# Patient Record
Sex: MALE | Race: WHITE | Employment: PART TIME | URBAN - METROPOLITAN AREA
[De-identification: names, ages, dates, MRNs, and addresses within clinical notes are randomized per-mention and may not be internally consistent; named-entity substitution may affect disease eponyms.]

---

## 2024-08-26 ENCOUNTER — OFFICE VISIT (OUTPATIENT)
Dept: ENDOCRINOLOGY | Facility: CLINIC | Age: 24
End: 2024-08-26
Payer: COMMERCIAL

## 2024-08-26 VITALS
SYSTOLIC BLOOD PRESSURE: 128 MMHG | WEIGHT: 131.4 LBS | OXYGEN SATURATION: 98 % | TEMPERATURE: 98.7 F | BODY MASS INDEX: 17.8 KG/M2 | HEART RATE: 96 BPM | HEIGHT: 72 IN | DIASTOLIC BLOOD PRESSURE: 70 MMHG

## 2024-08-26 DIAGNOSIS — E10.44 DIABETIC AMYOTROPHY ASSOCIATED WITH TYPE 1 DIABETES MELLITUS (HCC): ICD-10-CM

## 2024-08-26 DIAGNOSIS — E10.9 TYPE 1 DIABETES MELLITUS WITHOUT COMPLICATION (HCC): Primary | ICD-10-CM

## 2024-08-26 LAB — SL AMB POCT HEMOGLOBIN AIC: 8.7 (ref ?–6.5)

## 2024-08-26 PROCEDURE — 83036 HEMOGLOBIN GLYCOSYLATED A1C: CPT | Performed by: STUDENT IN AN ORGANIZED HEALTH CARE EDUCATION/TRAINING PROGRAM

## 2024-08-26 PROCEDURE — 99244 OFF/OP CNSLTJ NEW/EST MOD 40: CPT | Performed by: STUDENT IN AN ORGANIZED HEALTH CARE EDUCATION/TRAINING PROGRAM

## 2024-08-26 RX ORDER — INSULIN ASPART 100 [IU]/ML
INJECTION, SOLUTION INTRAVENOUS; SUBCUTANEOUS
Qty: 18 ML | Refills: 0 | Status: SHIPPED | OUTPATIENT
Start: 2024-08-26

## 2024-08-26 RX ORDER — BLOOD SUGAR DIAGNOSTIC
STRIP MISCELLANEOUS
Qty: 200 EACH | Refills: 1 | Status: SHIPPED | OUTPATIENT
Start: 2024-08-26

## 2024-08-26 RX ORDER — LANCETS 30 GAUGE
EACH MISCELLANEOUS
Qty: 200 EACH | Refills: 1 | Status: SHIPPED | OUTPATIENT
Start: 2024-08-26

## 2024-08-26 RX ORDER — GABAPENTIN 600 MG/1
600 TABLET ORAL 3 TIMES DAILY
COMMUNITY
Start: 2024-08-15

## 2024-08-26 RX ORDER — BUPRENORPHINE HYDROCHLORIDE AND NALOXONE HYDROCHLORIDE DIHYDRATE 8; 2 MG/1; MG/1
1 TABLET SUBLINGUAL DAILY
COMMUNITY
Start: 2024-08-15

## 2024-08-26 RX ORDER — ACYCLOVIR 400 MG/1
1 TABLET ORAL CONTINUOUS
Qty: 1 EACH | Refills: 0 | Status: SHIPPED | OUTPATIENT
Start: 2024-08-26 | End: 2024-11-24

## 2024-08-26 RX ORDER — INSULIN LISPRO 100 [IU]/ML
INJECTION, SOLUTION INTRAVENOUS; SUBCUTANEOUS
COMMUNITY
End: 2024-08-26

## 2024-08-26 RX ORDER — INSULIN GLARGINE 100 [IU]/ML
14 INJECTION, SOLUTION SUBCUTANEOUS 2 TIMES DAILY
COMMUNITY
End: 2024-08-26 | Stop reason: SDUPTHER

## 2024-08-26 RX ORDER — INSULIN GLARGINE 100 [IU]/ML
12 INJECTION, SOLUTION SUBCUTANEOUS 2 TIMES DAILY
Qty: 24 ML | Refills: 0 | Status: SHIPPED | OUTPATIENT
Start: 2024-08-26 | End: 2024-11-24

## 2024-08-26 RX ORDER — ACYCLOVIR 400 MG/1
1 TABLET ORAL
Qty: 9 EACH | Refills: 1 | Status: SHIPPED | OUTPATIENT
Start: 2024-08-26 | End: 2025-02-22

## 2024-08-26 RX ORDER — PEN NEEDLE, DIABETIC 32GX 5/32"
NEEDLE, DISPOSABLE MISCELLANEOUS 4 TIMES DAILY
Qty: 200 EACH | Refills: 1 | Status: SHIPPED | OUTPATIENT
Start: 2024-08-26 | End: 2024-11-24

## 2024-08-26 NOTE — PROGRESS NOTES
New Patient Progress Note      Cc: diabetes    Referring Provider  Referral Self  No address on file     History of Present Illness:   Gamaliel Coates is a 24 y.o. male with a history of type 1 diabetes since age 11 who presents to establish care with us.      He was following with endocrinology [facility].    He is currently on Lantus 12 units twice daily,   Humalog ICR : 1:10,   1 unit for each 30 above 180 mg/dl    He tried on and off insulin pump in the past,    No recent DKA,      SMBG:    X 3-4 daily    Hypoglycemic episodes:   H/o of hypoglycemia causing hospitalization or Intervention such as glucagon injection  or ambulance call : no  hypoglycemia awareness: yes :      Opthamology: UTD  Podiatry: no      Has hypertension: no  Has hyperlipidemia: no    Thyroid disorders: no    Patient Active Problem List   Diagnosis    Type 1 diabetes mellitus without complication (HCC)      No past medical history on file.   No past surgical history on file.   No family history on file.  Social History     Tobacco Use    Smoking status: Former     Types: Cigarettes    Smokeless tobacco: Not on file   Substance Use Topics    Alcohol use: Not Currently     No Known Allergies      Current Outpatient Medications:     buprenorphine-naloxone (SUBOXONE) 8-2 mg per SL tablet, Place 1 tablet under the tongue daily, Disp: , Rfl:     Continuous Glucose  (Dexcom G7 ) KOJO, Use 1 each continuous, Disp: 1 each, Rfl: 0    Continuous Glucose Sensor (Dexcom G7 Sensor), Use 1 Device every 10 days, Disp: 9 each, Rfl: 1    gabapentin (NEURONTIN) 600 MG tablet, Take 600 mg by mouth 3 (three) times a day, Disp: , Rfl:     glucose blood (OneTouch Verio) test strip, To check blood sugars 4 times daily, Disp: 200 each, Rfl: 1    insulin aspart (NovoLOG FlexPen) 100 UNIT/ML injection pen, ICR: 1: 10 , ISF : 30 with goal 180 Maximum 30 units, Disp: 18 mL, Rfl: 0    Insulin Glargine Solostar (Lantus SoloStar) 100 UNIT/ML SOPN,  "Inject 0.12 mL (12 Units total) under the skin 2 (two) times a day, Disp: 24 mL, Rfl: 0    Insulin Pen Needle (BD Pen Needle Cristal U/F) 32G X 4 MM MISC, Use 4 (four) times a day, Disp: 200 each, Rfl: 1    OneTouch Delica Lancets 30G MISC, To check blood sugars 4 times daily, Disp: 200 each, Rfl: 1  Review of Systems   Constitutional:  Negative for appetite change, fatigue and unexpected weight change.   HENT:  Negative for trouble swallowing.    Eyes:  Negative for visual disturbance.   Cardiovascular:  Negative for chest pain and palpitations.   Gastrointestinal:  Negative for abdominal pain, constipation, diarrhea, nausea and vomiting.   Endocrine: Negative for polydipsia, polyphagia and polyuria.       Physical Exam:  Body mass index is 17.82 kg/m².  /70 (BP Location: Right arm, Patient Position: Sitting, Cuff Size: Adult)   Pulse 96   Temp 98.7 °F (37.1 °C)   Ht 6' (1.829 m)   Wt 59.6 kg (131 lb 6.4 oz)   SpO2 98%   BMI 17.82 kg/m²    Wt Readings from Last 3 Encounters:   08/26/24 59.6 kg (131 lb 6.4 oz)       GEN: NAD  E/n/m nl facies,   Eyes: no stare or proptosis,   Neck: trachea midline, thyroid NT to palpation, nl in size,  CV; heart reg rate s1s2 nl,   Resp: CTAB, good effort  Ab+BS  Neuro: no tremor,   Skin: warm and dry,  Ext: no edema bilaterally,   Psych: nl mood and affect, no gross lapses in memory      Labs:   No components found for: \"HA1C\"  No components found for: \"GLU\"    Lab Results   Component Value Date    CREATININE 0.73 (L) 05/08/2018    BUN 7 (L) 05/08/2018    K 4.5 05/08/2018    CL 94 (L) 05/08/2018    CO2 27 05/08/2018     Component      Latest Ref Rng 8/26/2024   Hemoglobin A1C      6.5  8.7 !       Legend:  ! Abnormal    Impression:  1. Type 1 diabetes mellitus without complication (HCC)    2. 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  No prior records available, A1c which was checked at our office showed 8.7%, which is suboptimally controlled and our goal is below 6.7% with minimal hypoglycemia.  He tried Medtronic pump in the past, currently has Medtronic pump at home, (likely 7 70G) and he would like to resume pump, for which she was referred to our pump educator team.  I placed a Dexcom G7 sensor at her visit today, he was connected to the office and he will call us in 10 days to download his report.  Dexcom G7 sensor was ordered to his pharmacy to.  We will continue current regimen until we have more information regarding his blood sugars.  We reviewed hypoglycemia symptoms and rule of 15s to treat hypoglycemia.  He was advised to follow-up with podiatry and ophthalmology.  Return back in 3 months.  Labs as ordered.           Relevant Medications    Continuous Glucose Sensor (Dexcom G7 Sensor)    Continuous Glucose  (Dexcom G7 ) KOJO    insulin aspart (NovoLOG FlexPen) 100 UNIT/ML injection pen    Insulin Glargine Solostar (Lantus SoloStar) 100 UNIT/ML SOPN    Insulin Pen Needle (BD Pen Needle Cristal U/F) 32G X 4 MM MISC    glucose blood (OneTouch Verio) test strip    OneTouch Delica Lancets 30G MISC    Other Relevant Orders    Ambulatory referral to Diabetic Education    Comprehensive metabolic panel    Lipid Panel with Direct LDL reflex    Vitamin D 25 hydroxy    Albumin / creatinine urine ratio     Other Visit Diagnoses       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)        Relevant Medications    insulin aspart (NovoLOG FlexPen)  100 UNIT/ML injection pen    Insulin Glargine Solostar (Lantus SoloStar) 100 UNIT/ML SOPN    Other Relevant Orders    POCT hemoglobin A1c (Completed)              I have spent a total time of 40 minutes in caring for this patient on the day of the visit/encounter including Prognosis, Risks and benefits of tx options, Instructions for management, Patient and family education, Importance of tx compliance, Risk factor reductions, Impressions, Counseling / Coordination of care, Documenting in the medical record, Reviewing / ordering tests, medicine, procedures  , and Obtaining or reviewing history  .      Discussed with the patient and all questioned fully answered. He will call me if any problems arise.    Counseled patient on diagnostic results, prognosis, risk and benefit of treatment options, instruction for management, importance of treatment compliance, Risk  factor reduction and impressions      Luis Alberto Obrien MD

## 2024-08-27 ENCOUNTER — TELEPHONE (OUTPATIENT)
Age: 24
End: 2024-08-27

## 2024-08-27 NOTE — TELEPHONE ENCOUNTER
Patient states pharmacy called to let him know Dexcom  and sensors will need prior authorization from insurance. Please advise, thank you.

## 2024-08-27 NOTE — TELEPHONE ENCOUNTER
Pharmacy called they need the aspart directions clarified and resent, or he said can call him with verbal.

## 2024-08-28 ENCOUNTER — TELEPHONE (OUTPATIENT)
Dept: ENDOCRINOLOGY | Facility: CLINIC | Age: 24
End: 2024-08-28

## 2024-08-28 ENCOUNTER — TELEPHONE (OUTPATIENT)
Age: 24
End: 2024-08-28

## 2024-08-28 DIAGNOSIS — E10.9 TYPE 1 DIABETES MELLITUS WITHOUT COMPLICATION (HCC): ICD-10-CM

## 2024-08-28 RX ORDER — INSULIN GLARGINE 100 [IU]/ML
12 INJECTION, SOLUTION SUBCUTANEOUS 2 TIMES DAILY
Qty: 24 ML | Refills: 0 | OUTPATIENT
Start: 2024-08-28 | End: 2024-11-26

## 2024-08-28 NOTE — ASSESSMENT & PLAN NOTE
Lab Results   Component Value Date    HGBA1C 8.7 (A) 08/26/2024     History of type 1 diabetes since age 11, was following with endocrinology in [facility], presented to establish care with us, currently on Lantus 12 units twice a day and Humalog with insulin carb ratio of 1:10 and insulin sensitive factor of 1: 30 with goal of 180.  No prior records available, A1c which was checked at our office showed 8.7%, which is suboptimally controlled and our goal is below 6.7% with minimal hypoglycemia.  He tried Medtronic pump in the past, currently has Medtronic pump at home, (likely 7 70G) and he would like to resume pump, for which she was referred to our pump educator team.  I placed a Dexcom G7 sensor at her visit today, he was connected to the office and he will call us in 10 days to download his report.  Dexcom G7 sensor was ordered to his pharmacy to.  We will continue current regimen until we have more information regarding his blood sugars.  We reviewed hypoglycemia symptoms and rule of 15s to treat hypoglycemia.  He was advised to follow-up with podiatry and ophthalmology.  Return back in 3 months.  Labs as ordered.

## 2024-08-28 NOTE — TELEPHONE ENCOUNTER
Pt requesting lantus solostar.     This prescription was not released.     [facility]    SC/independent/needs device

## 2024-08-28 NOTE — TELEPHONE ENCOUNTER
Gamaliel called stating he would like to get set up with a pump. I gave him the office code to get his Dexcom G7 connected so we can print off his report as he says its all over the place. He will let us know once he is connected to the office.

## 2024-08-28 NOTE — TELEPHONE ENCOUNTER
PA for Continuous Glucose Sensor (Dexcom G7 Sensor) SUBMITTED     via    []CMM-KEY:    [x]Surescripts-Case ID # SS  []Faxed to plan   []Other website    []Phone call Case ID #      Office notes sent, clinical questions answered. Awaiting determination    Turnaround time for your insurance to make a decision on your Prior Authorization can take 7-21 business days.

## 2024-08-28 NOTE — TELEPHONE ENCOUNTER
Reason for call:   [x] Prior Auth  [] Other:     Caller:  [] Patient  [x] Pharmacy  Name [facility] [address]  Address:[address] [place] [address] [place]  Callback Number: [phone]    Medication:Lantus SolorStar 100 unit/Ml    Dose/Frequency: 12 units 2 times daily    Quantity: 24 ML    Ordering Provider:   [] PCP/Provider -   [x] Speciality/Provider - Dr Luis Alberto Obrien MD    Has the patient tried other medications and failed? If failed, which medications did they fail?    [] No   [x] Yes -     Is the patient's insurance updated in EPIC?   [x] Yes   [] No     Is a copy of the patient's insurance scanned in EPIC?   [x] Yes   [] No

## 2024-08-28 NOTE — TELEPHONE ENCOUNTER
PA for insulin aspart (NovoLOG FlexPen) 100 UNIT/ML injection pen SUBMITTED     via    []CMM-KEY:    [x]Surescripts-Case ID # SS  []Faxed to plan   []Other website    []Phone call Case ID #      Office notes sent, clinical questions answered. Awaiting determination    Turnaround time for your insurance to make a decision on your Prior Authorization can take 7-21 business days.

## 2024-08-28 NOTE — TELEPHONE ENCOUNTER
PA for Continuous Glucose  (Dexcom G7 ) KOJO SUBMITTED     via    []CMM-KEY:    [x]Surescripts-Case ID # SS  []Faxed to plan   []Other website    []Phone call Case ID #      Office notes sent, clinical questions answered. Awaiting determination    Turnaround time for your insurance to make a decision on your Prior Authorization can take 7-21 business days.

## 2024-08-29 ENCOUNTER — TELEPHONE (OUTPATIENT)
Dept: ENDOCRINOLOGY | Facility: CLINIC | Age: 24
End: 2024-08-29

## 2024-08-29 NOTE — TELEPHONE ENCOUNTER
PA for Continuous Glucose Sensor (Dexcom G7 Sensor)  APPROVED     Date(s) approved August 23, 2024 to August 28, 2025     Case #[id number]     Patient advised by          []MyChart Message  []Phone call   []LMOM  []L/M to call office as no active Communication consent on file  []Unable to leave detailed message as VM not approved on Communication consent       Pharmacy advised by    [x]Fax  []Phone call    Approval letter scanned into Media Yes

## 2024-08-29 NOTE — TELEPHONE ENCOUNTER
PA for Continuous Glucose  (Dexcom G7 ) KOJO  APPROVED     Date(s) approved August 23, 2024 to August 28, 2025     Case #[id number]     Patient advised by          []MyChart Message  []Phone call   []LMOM  []L/M to call office as no active Communication consent on file  []Unable to leave detailed message as VM not approved on Communication consent       Pharmacy advised by    [x]Fax  []Phone call    Approval letter scanned into Media Yes

## 2024-08-30 NOTE — TELEPHONE ENCOUNTER
Patient called to check the status of prior auths. Informed him looks like the Dexcom was approved but looks like no reply on the insulin yet.  Patient also said he was able to download the tasha now for the Dexcom so he should be connected to the office.

## 2024-08-31 NOTE — TELEPHONE ENCOUNTER
PA for LANTUS 100 U (BRAND NAME ONLY) NOT REQUIRED    Reason       MESSAGE SENT TO OFFICE NEED NEW SCRIPT    Patient advised by          [] MyChart Message  [] Phone call   []LMOM  []L/M to call office as no active Communication consent on file  []Unable to leave detailed message as VM not approved on Communication consent       Pharmacy advised by    []Fax  []Phone call

## 2024-09-03 DIAGNOSIS — E10.9 TYPE 1 DIABETES MELLITUS WITHOUT COMPLICATION (HCC): Primary | ICD-10-CM

## 2024-09-03 RX ORDER — INSULIN GLARGINE 100 [IU]/ML
12 INJECTION, SOLUTION SUBCUTANEOUS 2 TIMES DAILY
Qty: 24 ML | Refills: 0 | Status: SHIPPED | OUTPATIENT
Start: 2024-09-03 | End: 2024-12-02

## 2024-09-06 ENCOUNTER — TELEPHONE (OUTPATIENT)
Dept: DIABETES SERVICES | Facility: CLINIC | Age: 24
End: 2024-09-06

## 2024-09-06 NOTE — TELEPHONE ENCOUNTER
L/M for patient to schedule pump assessment. If patient calls back, please schedule a new patient pump assessment class for 75 minutes.

## 2024-10-17 ENCOUNTER — TELEPHONE (OUTPATIENT)
Age: 24
End: 2024-10-17

## 2024-10-17 NOTE — TELEPHONE ENCOUNTER
Pt calling to schedule his insulin pump training for dexcom.      unable to reach a team member at this time, please call

## 2024-10-29 DIAGNOSIS — E10.9 TYPE 1 DIABETES MELLITUS WITHOUT COMPLICATION (HCC): ICD-10-CM

## 2024-10-29 RX ORDER — INSULIN GLARGINE 100 [IU]/ML
12 INJECTION, SOLUTION SUBCUTANEOUS 2 TIMES DAILY
Qty: 15 ML | Refills: 2 | Status: SHIPPED | OUTPATIENT
Start: 2024-10-29 | End: 2025-05-05

## 2024-10-29 NOTE — TELEPHONE ENCOUNTER
"Phone call from patient to cancel Diabetic Education Appt for today due to \"car trouble\".   Attempted to reschedule appt, and unfortunately due to scheduling tree - denied scheduling. Please contact patient with new appt.   "

## 2024-10-31 ENCOUNTER — TELEPHONE (OUTPATIENT)
Age: 24
End: 2024-10-31

## 2024-10-31 NOTE — TELEPHONE ENCOUNTER
PA for Insulin Glargine Solostar (Lantus SoloStar) 100 UNIT/ML SOPN SUBMITTED     via      [x][employer]-Case ID # [id number]       Office notes sent, clinical questions answered. Awaiting determination    Turnaround time for your insurance to make a decision on your Prior Authorization can take 7-21 business days.

## 2025-02-19 DIAGNOSIS — E10.9 TYPE 1 DIABETES MELLITUS WITHOUT COMPLICATION (HCC): ICD-10-CM

## 2025-02-20 RX ORDER — ACYCLOVIR 400 MG/1
1 TABLET ORAL
Qty: 9 EACH | Refills: 1 | Status: SHIPPED | OUTPATIENT
Start: 2025-02-20 | End: 2025-08-19